# Patient Record
Sex: MALE | Race: BLACK OR AFRICAN AMERICAN | NOT HISPANIC OR LATINO | Employment: UNEMPLOYED | URBAN - METROPOLITAN AREA
[De-identification: names, ages, dates, MRNs, and addresses within clinical notes are randomized per-mention and may not be internally consistent; named-entity substitution may affect disease eponyms.]

---

## 2017-06-07 ENCOUNTER — HISTORICAL (OUTPATIENT)
Dept: RADIOLOGY | Facility: HOSPITAL | Age: 42
End: 2017-06-07

## 2017-07-05 ENCOUNTER — HISTORICAL (OUTPATIENT)
Dept: RADIOLOGY | Facility: HOSPITAL | Age: 42
End: 2017-07-05

## 2018-12-24 ENCOUNTER — HISTORICAL (OUTPATIENT)
Dept: LAB | Facility: HOSPITAL | Age: 43
End: 2018-12-24

## 2018-12-24 LAB
ABS NEUT (OLG): 3.2 X10(3)/MCL (ref 1.5–6.9)
ALBUMIN SERPL-MCNC: 3.3 GM/DL (ref 3.4–5)
ALBUMIN/GLOB SERPL: 0.8 RATIO
ALP SERPL-CCNC: 127 UNIT/L (ref 30–113)
ALT SERPL-CCNC: 30 UNIT/L (ref 10–45)
AST SERPL-CCNC: 28 UNIT/L (ref 15–37)
BASOPHILS # BLD AUTO: 0.1 X10(3)/MCL (ref 0–0.1)
BASOPHILS NFR BLD AUTO: 1 % (ref 0–1)
BILIRUB SERPL-MCNC: 0.4 MG/DL (ref 0.1–0.9)
BILIRUBIN DIRECT+TOT PNL SERPL-MCNC: 0.1 MG/DL (ref 0–0.3)
BILIRUBIN DIRECT+TOT PNL SERPL-MCNC: 0.3 MG/DL
BUN SERPL-MCNC: 13 MG/DL (ref 10–20)
CALCIUM SERPL-MCNC: 8.6 MG/DL (ref 8–10.5)
CHLORIDE SERPL-SCNC: 106 MMOL/L (ref 100–108)
CHOLEST SERPL-MCNC: 210 MG/DL (ref 140–200)
CHOLEST/HDLC SERPL: 6 MG/DL (ref 0–8)
CO2 SERPL-SCNC: 27 MMOL/L (ref 21–35)
CREAT SERPL-MCNC: 1.22 MG/DL (ref 0.7–1.3)
EOSINOPHIL # BLD AUTO: 0.2 X10(3)/MCL (ref 0–0.6)
EOSINOPHIL NFR BLD AUTO: 2 % (ref 0–5)
ERYTHROCYTE [DISTWIDTH] IN BLOOD BY AUTOMATED COUNT: 14.3 % (ref 11.5–17)
GLOBULIN SER-MCNC: 4.1 GM/DL
GLUCOSE SERPL-MCNC: 111 MG/DL (ref 75–116)
HCT VFR BLD AUTO: 41.8 % (ref 42–52)
HDLC SERPL-MCNC: 37 MG/DL (ref 35–59)
HGB BLD-MCNC: 12.8 GM/DL (ref 14–18)
IMM GRANULOCYTES # BLD AUTO: 0.03 10*3/UL (ref 0–0.02)
IMM GRANULOCYTES NFR BLD AUTO: 0.4 % (ref 0–0.43)
LDLC SERPL CALC-MCNC: 163 MG/DL (ref 100–129)
LYMPHOCYTES # BLD AUTO: 3 X10(3)/MCL (ref 0.5–4.1)
LYMPHOCYTES NFR BLD AUTO: 41 % (ref 15–40)
MCH RBC QN AUTO: 28 PG (ref 27–34)
MCHC RBC AUTO-ENTMCNC: 31 GM/DL (ref 31–36)
MCV RBC AUTO: 93 FL (ref 80–99)
MONOCYTES # BLD AUTO: 0.8 X10(3)/MCL (ref 0–1.1)
MONOCYTES NFR BLD AUTO: 11 % (ref 4–12)
NEUTROPHILS # BLD AUTO: 3.2 X10(3)/MCL (ref 1.5–6.9)
NEUTROPHILS NFR BLD AUTO: 45 % (ref 43–75)
PLATELET # BLD AUTO: 250 X10(3)/MCL (ref 140–400)
PMV BLD AUTO: 9.2 FL (ref 6.8–10)
POTASSIUM SERPL-SCNC: 4.2 MMOL/L (ref 3.6–5.2)
PROT SERPL-MCNC: 7.4 GM/DL (ref 6.4–8.2)
RBC # BLD AUTO: 4.51 X10(6)/MCL (ref 4.7–6.1)
SODIUM SERPL-SCNC: 141 MMOL/L (ref 135–145)
TRIGL SERPL-MCNC: 65 MG/DL (ref 35–150)
TSH SERPL-ACNC: 0.41 MIU/ML (ref 0.36–3.74)
VLDLC SERPL CALC-MCNC: 13 MG/DL
WBC # SPEC AUTO: 7.3 X10(3)/MCL (ref 4.5–11.5)

## 2019-08-16 ENCOUNTER — HISTORICAL (OUTPATIENT)
Dept: RADIOLOGY | Facility: HOSPITAL | Age: 44
End: 2019-08-16

## 2020-07-11 ENCOUNTER — HOSPITAL ENCOUNTER (EMERGENCY)
Facility: HOSPITAL | Age: 45
Discharge: HOME | End: 2020-07-11
Attending: EMERGENCY MEDICINE
Payer: MEDICAID

## 2020-07-11 VITALS
DIASTOLIC BLOOD PRESSURE: 111 MMHG | OXYGEN SATURATION: 98 % | TEMPERATURE: 98 F | RESPIRATION RATE: 14 BRPM | HEART RATE: 86 BPM | SYSTOLIC BLOOD PRESSURE: 159 MMHG

## 2020-07-11 DIAGNOSIS — J18.9 PNEUMONIA OF RIGHT LOWER LOBE DUE TO INFECTIOUS ORGANISM: Primary | ICD-10-CM

## 2020-07-11 DIAGNOSIS — I10 ESSENTIAL HYPERTENSION, BENIGN: ICD-10-CM

## 2020-07-11 PROBLEM — F29 PSYCHOSIS: Status: ACTIVE | Noted: 2020-07-11

## 2020-07-11 PROBLEM — R05.9 COUGH: Status: ACTIVE | Noted: 2020-07-11

## 2020-07-11 LAB — SARS-COV-2 RDRP RESP QL NAA+PROBE: NEGATIVE

## 2020-07-11 PROCEDURE — 99283 EMERGENCY DEPT VISIT LOW MDM: CPT | Mod: 25,ER

## 2020-07-11 PROCEDURE — U0002 COVID-19 LAB TEST NON-CDC: HCPCS | Mod: ER

## 2020-07-11 PROCEDURE — 63700000 PHARM REV CODE 250 ALT 637 W/O HCPCS: Mod: ER | Performed by: PHYSICIAN ASSISTANT

## 2020-07-11 PROCEDURE — 25000003 PHARM REV CODE 250: Mod: ER | Performed by: PHYSICIAN ASSISTANT

## 2020-07-11 RX ORDER — AZITHROMYCIN 250 MG/1
500 TABLET, FILM COATED ORAL
Status: COMPLETED | OUTPATIENT
Start: 2020-07-11 | End: 2020-07-11

## 2020-07-11 RX ORDER — AZITHROMYCIN 250 MG/1
TABLET, FILM COATED ORAL
Qty: 4 TABLET | Refills: 0 | Status: ON HOLD | OUTPATIENT
Start: 2020-07-12 | End: 2020-07-21 | Stop reason: HOSPADM

## 2020-07-11 RX ORDER — CLONIDINE HYDROCHLORIDE 0.1 MG/1
0.1 TABLET ORAL
Status: COMPLETED | OUTPATIENT
Start: 2020-07-11 | End: 2020-07-11

## 2020-07-11 RX ADMIN — CLONIDINE HYDROCHLORIDE 0.1 MG: 0.1 TABLET ORAL at 03:07

## 2020-07-11 RX ADMIN — AZITHROMYCIN MONOHYDRATE 500 MG: 250 TABLET ORAL at 03:07

## 2020-07-11 NOTE — ED PROVIDER NOTES
Encounter Date: 7/11/2020       History     Chief Complaint   Patient presents with    Cough     Per [facility] cough for last 3 days with no covid test on file. would like pt tested.     Patient presents via EMS from [facility] with complaint of 2-3 week history of cough. No chest pain or shortness of breath. No fever or chills. No known exposure to illness.         Review of patient's allergies indicates:  No Known Allergies  History reviewed. No pertinent past medical history.  No past surgical history on file.  History reviewed. No pertinent family history.  Social History     Tobacco Use    Smoking status: Not on file   Substance Use Topics    Alcohol use: Not on file    Drug use: Not on file     Review of Systems   Constitutional: Negative for activity change, appetite change, chills and fever.   HENT: Negative for congestion, ear pain and sore throat.    Respiratory: Positive for cough. Negative for shortness of breath, wheezing and stridor.    Cardiovascular: Negative for chest pain, palpitations and leg swelling.   Gastrointestinal: Negative for abdominal pain, diarrhea, nausea and vomiting.   Musculoskeletal: Negative for back pain, neck pain and neck stiffness.   Skin: Negative for rash.   Neurological: Negative for dizziness, weakness, numbness and headaches.   All other systems reviewed and are negative.      Physical Exam     Initial Vitals [07/11/20 1449]   BP Pulse Resp Temp SpO2   (!) 149/102 89 18 98.4 °F (36.9 °C) 97 %      MAP       --         Physical Exam    Nursing note and vitals reviewed.  Constitutional: He appears well-developed and well-nourished. He appears distressed (mild. Non-toxic appearing).   HENT:   Head: Normocephalic and atraumatic.   Mouth/Throat: Oropharynx is clear and moist.   Neck: Normal range of motion. Neck supple.   Cardiovascular: Normal rate, regular rhythm, normal heart sounds and intact distal pulses.   Pulmonary/Chest: Breath sounds normal. No respiratory  distress.   Musculoskeletal: No edema.   Lymphadenopathy:     He has no cervical adenopathy.   Neurological: He is alert and oriented to person, place, and time.   Skin: Skin is warm and dry. No rash noted.   Psychiatric: He has a normal mood and affect. His behavior is normal. Judgment and thought content normal.         ED Course   Procedures  Labs Reviewed   SARS-COV-2 RNA AMPLIFICATION, QUAL          Imaging Results          X-Ray Chest AP Portable (Final result)  Result time 07/11/20 15:10:44    Final result by Kareem Harding MD (07/11/20 15:10:44)                 Impression:      Subtle right basilar atelectasis and/or infiltrate      Electronically signed by: Kareem Harding MD  Date:    07/11/2020  Time:    15:10             Narrative:    EXAMINATION:  XR CHEST AP PORTABLE    CLINICAL HISTORY:  Suspected Covid-19 Virus Infection;    COMPARISON:  None.    FINDINGS:  The left lung appears clear.  There is some subtle right basilar atelectasis and/or infiltrate in the right lung base.  There is mild cardiomegaly.  There is no pleural effusion or pneumothorax or pulmonary edema.                                 Medical Decision Making:   Cannot completely exclude Covid 19 given the chest xray findings, however the rapid test was negative. Discussed this with the nurse at [facility]. Recommend quarantine until at least 72 hours after symptoms resolve.                                  Clinical Impression:       ICD-10-CM ICD-9-CM   1. Pneumonia of right lower lobe due to infectious organism  J18.1 486   2. Essential hypertension, benign  I10 401.1         Disposition:   Disposition: Discharged     ED Disposition Condition    Discharge Stable        ED Prescriptions     Medication Sig Dispense Start Date End Date Auth. Provider    azithromycin (ZITHROMAX Z-THALIA) 250 MG tablet Take 1 tablet daily 4 tablet 7/12/2020  IZABELLA Mariscal        Follow-up Information    Amanuel Lorenzo  IZABELLA Reynolds  07/11/20 1548       IZABELLA Mariscal  07/11/20 1701

## 2020-07-11 NOTE — DISCHARGE INSTRUCTIONS
Cannot completely rule out Covid 19. Recommend quarantine for 10 days from onset AND 3 days after symptoms resolve.

## 2020-08-16 ENCOUNTER — HISTORICAL (OUTPATIENT)
Dept: RADIOLOGY | Facility: HOSPITAL | Age: 45
End: 2020-08-16

## 2020-09-17 ENCOUNTER — HISTORICAL (OUTPATIENT)
Dept: RADIOLOGY | Facility: HOSPITAL | Age: 45
End: 2020-09-17

## 2021-08-03 ENCOUNTER — HISTORICAL (OUTPATIENT)
Dept: RADIOLOGY | Facility: HOSPITAL | Age: 46
End: 2021-08-03

## 2022-04-12 NOTE — ED NOTES
Patient standing in doorway talking with staff. No distress noted.     Product 66 Price/Unit (In Dollars): 0 Product 2 Unit Type: mg Product 14 Application Directions: apply to AA QD Product 14 Unit Type: grams Product 1 Price/Unit (In Dollars): 45 Product 1 Refills: 3 Name Of Product 14: Rosacea Silicone Gel Render Refills If Set To 0: Yes Product 14 Refills: 6 Product 14 Amount/Unit (Numbers Only): 30 Product 1 Units Dispensed: 1 Product 1 Application Directions: Apply a small amount to the entire face nightly Name Of Product 13: Rosacea Cream- Azelaic Acid Detail Level: Zone

## 2022-05-15 ENCOUNTER — HOSPITAL ENCOUNTER (EMERGENCY)
Facility: HOSPITAL | Age: 47
Discharge: HOME | End: 2022-05-15
Attending: INTERNAL MEDICINE
Payer: MEDICAID

## 2022-05-15 VITALS
HEART RATE: 72 BPM | SYSTOLIC BLOOD PRESSURE: 154 MMHG | DIASTOLIC BLOOD PRESSURE: 96 MMHG | WEIGHT: 235 LBS | HEIGHT: 68 IN | OXYGEN SATURATION: 98 % | BODY MASS INDEX: 35.61 KG/M2 | RESPIRATION RATE: 18 BRPM | TEMPERATURE: 99 F

## 2022-05-15 DIAGNOSIS — R79.89 ELEVATED SERUM CREATININE: ICD-10-CM

## 2022-05-15 DIAGNOSIS — F19.10 POLYSUBSTANCE ABUSE: Primary | ICD-10-CM

## 2022-05-15 DIAGNOSIS — T40.601A OPIATE OVERDOSE, ACCIDENTAL OR UNINTENTIONAL, INITIAL ENCOUNTER: ICD-10-CM

## 2022-05-15 LAB
ALBUMIN SERPL-MCNC: 3.5 GM/DL (ref 3.5–5)
ALBUMIN/GLOB SERPL: 1.1 RATIO (ref 1.1–2)
ALP SERPL-CCNC: 81 UNIT/L (ref 40–150)
ALT SERPL-CCNC: 23 UNIT/L (ref 0–55)
AMPHET UR QL SCN: NEGATIVE
APAP SERPL-MCNC: <17.4 UG/ML (ref 17.4–30)
APPEARANCE UR: CLEAR
AST SERPL-CCNC: 23 UNIT/L (ref 5–34)
BACTERIA #/AREA URNS AUTO: ABNORMAL /HPF
BARBITURATE SCN PRESENT UR: NEGATIVE
BASOPHILS # BLD AUTO: 0.05 X10(3)/MCL (ref 0–0.2)
BASOPHILS NFR BLD AUTO: 0.5 %
BENZODIAZ UR QL SCN: NEGATIVE
BILIRUB UR QL STRIP.AUTO: NEGATIVE MG/DL
BILIRUBIN DIRECT+TOT PNL SERPL-MCNC: 0.3 MG/DL
BUN SERPL-MCNC: 13 MG/DL (ref 8.9–20.6)
CALCIUM SERPL-MCNC: 8.7 MG/DL (ref 8.4–10.2)
CANNABINOIDS UR QL SCN: POSITIVE
CHLORIDE SERPL-SCNC: 108 MMOL/L (ref 98–107)
CO2 SERPL-SCNC: 24 MMOL/L (ref 22–29)
COCAINE UR QL SCN: POSITIVE
COLOR UR AUTO: YELLOW
CREAT SERPL-MCNC: 1.66 MG/DL (ref 0.73–1.18)
EOSINOPHIL # BLD AUTO: 0.15 X10(3)/MCL (ref 0–0.9)
EOSINOPHIL NFR BLD AUTO: 1.5 %
ERYTHROCYTE [DISTWIDTH] IN BLOOD BY AUTOMATED COUNT: 14.6 % (ref 11.5–17)
ETHANOL SERPL-MCNC: <10 MG/DL
FENTANYL UR QL SCN: POSITIVE
GLOBULIN SER-MCNC: 3.3 GM/DL (ref 2.4–3.5)
GLUCOSE SERPL-MCNC: 163 MG/DL (ref 74–100)
GLUCOSE UR QL STRIP.AUTO: NEGATIVE MG/DL
HCT VFR BLD AUTO: 39.3 % (ref 42–52)
HGB BLD-MCNC: 12.2 GM/DL (ref 14–18)
IMM GRANULOCYTES # BLD AUTO: 0.07 X10(3)/MCL (ref 0–0.02)
IMM GRANULOCYTES NFR BLD AUTO: 0.7 % (ref 0–0.43)
KETONES UR QL STRIP.AUTO: NEGATIVE MG/DL
LEUKOCYTE ESTERASE UR QL STRIP.AUTO: NEGATIVE UNIT/L
LYMPHOCYTES # BLD AUTO: 2.91 X10(3)/MCL (ref 0.6–4.6)
LYMPHOCYTES NFR BLD AUTO: 28.6 %
MCH RBC QN AUTO: 29.2 PG (ref 27–31)
MCHC RBC AUTO-ENTMCNC: 31 MG/DL (ref 33–36)
MCV RBC AUTO: 94 FL (ref 80–94)
MDMA UR QL SCN: NEGATIVE
MONOCYTES # BLD AUTO: 0.82 X10(3)/MCL (ref 0.1–1.3)
MONOCYTES NFR BLD AUTO: 8.1 %
MUCOUS THREADS URNS QL MICRO: ABNORMAL /LPF
NEUTROPHILS # BLD AUTO: 6.2 X10(3)/MCL (ref 2.1–9.2)
NEUTROPHILS NFR BLD AUTO: 60.6 %
NITRITE UR QL STRIP.AUTO: NEGATIVE
OPIATES UR QL SCN: NEGATIVE
PCP UR QL: POSITIVE
PH UR STRIP.AUTO: 5 [PH]
PH UR: 5 [PH] (ref 3–11)
PLATELET # BLD AUTO: 296 X10(3)/MCL (ref 130–400)
PMV BLD AUTO: 8.8 FL (ref 9.4–12.4)
POTASSIUM SERPL-SCNC: 3.9 MMOL/L (ref 3.5–5.1)
PROT SERPL-MCNC: 6.8 GM/DL (ref 6.4–8.3)
PROT UR QL STRIP.AUTO: ABNORMAL MG/DL
RBC # BLD AUTO: 4.18 X10(6)/MCL (ref 4.7–6.1)
RBC #/AREA URNS AUTO: ABNORMAL /HPF
RBC UR QL AUTO: NEGATIVE UNIT/L
SODIUM SERPL-SCNC: 141 MMOL/L (ref 136–145)
SP GR UR STRIP.AUTO: >=1.03
SPECIFIC GRAVITY, URINE AUTO (.000) (OHS): >=1.03 (ref 1–1.03)
SQUAMOUS #/AREA URNS AUTO: ABNORMAL /LPF
UROBILINOGEN UR STRIP-ACNC: 0.2 MG/DL
WBC # SPEC AUTO: 10.2 X10(3)/MCL (ref 4.5–11.5)
WBC #/AREA URNS AUTO: ABNORMAL /HPF

## 2022-05-15 PROCEDURE — 96374 THER/PROPH/DIAG INJ IV PUSH: CPT

## 2022-05-15 PROCEDURE — 96361 HYDRATE IV INFUSION ADD-ON: CPT

## 2022-05-15 PROCEDURE — 81001 URINALYSIS AUTO W/SCOPE: CPT | Mod: 59 | Performed by: NURSE PRACTITIONER

## 2022-05-15 PROCEDURE — 80053 COMPREHEN METABOLIC PANEL: CPT | Performed by: NURSE PRACTITIONER

## 2022-05-15 PROCEDURE — 80307 DRUG TEST PRSMV CHEM ANLYZR: CPT | Performed by: NURSE PRACTITIONER

## 2022-05-15 PROCEDURE — 63600175 PHARM REV CODE 636 W HCPCS: Performed by: NURSE PRACTITIONER

## 2022-05-15 PROCEDURE — 36415 COLL VENOUS BLD VENIPUNCTURE: CPT | Performed by: NURSE PRACTITIONER

## 2022-05-15 PROCEDURE — 80143 DRUG ASSAY ACETAMINOPHEN: CPT | Performed by: NURSE PRACTITIONER

## 2022-05-15 PROCEDURE — 82077 ASSAY SPEC XCP UR&BREATH IA: CPT | Performed by: NURSE PRACTITIONER

## 2022-05-15 PROCEDURE — 99284 EMERGENCY DEPT VISIT MOD MDM: CPT | Mod: 25

## 2022-05-15 PROCEDURE — 85025 COMPLETE CBC W/AUTO DIFF WBC: CPT | Performed by: NURSE PRACTITIONER

## 2022-05-15 RX ORDER — NALOXONE HYDROCHLORIDE 1 MG/ML
INJECTION INTRAMUSCULAR; INTRAVENOUS; SUBCUTANEOUS
Qty: 2 ML | Refills: 11 | Status: SHIPPED | OUTPATIENT
Start: 2022-05-15

## 2022-05-15 RX ORDER — NALOXONE HCL 0.4 MG/ML
1 VIAL (ML) INJECTION
Status: COMPLETED | OUTPATIENT
Start: 2022-05-15 | End: 2022-05-15

## 2022-05-15 RX ADMIN — SODIUM CHLORIDE, POTASSIUM CHLORIDE, SODIUM LACTATE AND CALCIUM CHLORIDE 1000 ML: 600; 310; 30; 20 INJECTION, SOLUTION INTRAVENOUS at 05:05

## 2022-05-15 RX ADMIN — NALOXONE HYDROCHLORIDE 1 MG: 0.4 INJECTION, SOLUTION INTRAMUSCULAR; INTRAVENOUS; SUBCUTANEOUS at 05:05

## 2022-05-15 NOTE — ED PROVIDER NOTES
"Encounter Date: 5/15/2022       History     Chief Complaint   Patient presents with    Drug Overdose     Found unresponsive with GCS 3,  Narcan 2mg given pta,  GCS is now 1,  pt states " I was just chillin"      46 year old brought in by EMS. Pt.was found unresponsive in car by bystander and called PD. Narcan 2 mg intranasally given by PD upon which he awoke. EMS did not give him additional Narcan. Pt.st. he does not know what happened to him. He st.did drugs today but does not know what.         Review of patient's allergies indicates:  No Known Allergies  No past medical history on file.  No past surgical history on file.  No family history on file.  Social History     Tobacco Use    Smoking status: Never Smoker   Substance Use Topics    Alcohol use: Yes     Review of Systems   Unable to perform ROS: Mental status change       Physical Exam     Initial Vitals [05/15/22 1715]   BP Pulse Resp Temp SpO2   118/60 105 18 98.6 °F (37 °C) 100 %      MAP       --         Physical Exam    Nursing note and vitals reviewed.  Constitutional: He appears well-developed and well-nourished.   Somnolent but arouses with verbal stimulation.   HENT:   Head: Normocephalic and atraumatic.   Right Ear: Tympanic membrane normal.   Left Ear: Tympanic membrane normal.   Nose: Nose normal.   Mouth/Throat: Oropharynx is clear and moist.   Gag reflex intact   Eyes: Conjunctivae and lids are normal.   Pupils pinpoint bilaterally   Neck: Neck supple.   Normal range of motion.  Cardiovascular: Regular rhythm and normal heart sounds. Tachycardia present.    Pulmonary/Chest: Effort normal.   Pulse ox decreases to 88% when patient falls asleep. Oxygen per NC x 3 L administered.   Abdominal: Abdomen is soft and protuberant. Bowel sounds are normal. There is no abdominal tenderness.   Musculoskeletal:         General: Normal range of motion.      Cervical back: Normal range of motion and neck supple.      Comments: No signs of trauma "     Neurological: He has normal strength. No cranial nerve deficit or sensory deficit. GCS eye subscore is 4. GCS verbal subscore is 5. GCS motor subscore is 6.   Somnolent but arouses with verbal stimuli.   Skin: Skin is warm, dry and intact.   Psychiatric: He has a normal mood and affect. His speech is normal and behavior is normal. Thought content normal. He is not slowed. He expresses no homicidal and no suicidal ideation. He exhibits abnormal recent memory.   Pt.has not recall of events prior to arrival in ED.         ED Course   Procedures  Labs Reviewed   COMPREHENSIVE METABOLIC PANEL - Abnormal; Notable for the following components:       Result Value    Chloride 108 (*)     Glucose Level 163 (*)     Creatinine 1.66 (*)     All other components within normal limits   URINALYSIS, REFLEX TO URINE CULTURE - Abnormal; Notable for the following components:    Protein, UA Trace (*)     All other components within normal limits   DRUG SCREEN, URINE (BEAKER) - Abnormal; Notable for the following components:    Cannabinoids, Urine Positive (*)     Cocaine, Urine Positive (*)     Fentanyl, Urine Positive (*)     Phencyclidine, Urine Positive (*)     All other components within normal limits   ACETAMINOPHEN LEVEL - Abnormal; Notable for the following components:    Acetaminophen Level <17.4 (*)     All other components within normal limits   CBC WITH DIFFERENTIAL - Abnormal; Notable for the following components:    RBC 4.18 (*)     Hgb 12.2 (*)     Hct 39.3 (*)     MCHC 31.0 (*)     MPV 8.8 (*)     IG# 0.07 (*)     IG% 0.7 (*)     All other components within normal limits   URINALYSIS, MICROSCOPIC - Abnormal; Notable for the following components:    Mucous, UA Trace (*)     Squamous Epithelial Cells, UA Few (*)     All other components within normal limits   ALCOHOL,MEDICAL (ETHANOL) - Normal   CBC W/ AUTO DIFFERENTIAL    Narrative:     The following orders were created for panel order CBC auto differential.  Procedure                                Abnormality         Status                     ---------                               -----------         ------                     CBC with Differential[[id number]]        Abnormal            Final result                 Please view results for these tests on the individual orders.     Admission on 05/15/2022   Component Date Value Ref Range Status    Sodium Level 05/15/2022 141  136 - 145 mmol/L Final    Potassium Level 05/15/2022 3.9  3.5 - 5.1 mmol/L Final    Chloride 05/15/2022 108 (A) 98 - 107 mmol/L Final    Carbon Dioxide 05/15/2022 24  22 - 29 mmol/L Final    Glucose Level 05/15/2022 163 (A) 74 - 100 mg/dL Final    Blood Urea Nitrogen 05/15/2022 13.0  8.9 - 20.6 mg/dL Final    Creatinine 05/15/2022 1.66 (A) 0.73 - 1.18 mg/dL Final    Calcium Level Total 05/15/2022 8.7  8.4 - 10.2 mg/dL Final    Protein Total 05/15/2022 6.8  6.4 - 8.3 gm/dL Final    Albumin Level 05/15/2022 3.5  3.5 - 5.0 gm/dL Final    Globulin 05/15/2022 3.3  2.4 - 3.5 gm/dL Final    Albumin/Globulin Ratio 05/15/2022 1.1  1.1 - 2.0 ratio Final    Bilirubin Total 05/15/2022 0.3  <=1.5 mg/dL Final    Alkaline Phosphatase 05/15/2022 81  40 - 150 unit/L Final    Alanine Aminotransferase 05/15/2022 23  0 - 55 unit/L Final    Aspartate Aminotransferase 05/15/2022 23  5 - 34 unit/L Final    Estimated GFR- 05/15/2022 58  mls/min/1.73/m2 Final    Color, UA 05/15/2022 Yellow  Yellow, Colorless, Other, Clear Final    Appearance, UA 05/15/2022 Clear  Clear Final    Specific Gravity, UA 05/15/2022 >=1.030   Final    pH, UA 05/15/2022 5.0  5.0, 5.5, 6.0, 6.5, 7.0, 7.5, 8.0, 8.5 Final    Protein, UA 05/15/2022 Trace (A) Negative, 300  mg/dL Final    Glucose, UA 05/15/2022 Negative  Negative, Normal mg/dL Final    Ketones, UA 05/15/2022 Negative  Negative, +1, +2, +3, +4, +5, >=160 mg/dL Final    Blood, UA 05/15/2022 Negative  Negative unit/L Final    Bilirubin, UA 05/15/2022  Negative  Negative mg/dL Final    Urobilinogen, UA 05/15/2022 0.2  0.2, 1.0, Normal mg/dL Final    Nitrites, UA 05/15/2022 Negative  Negative Final    Leukocyte Esterase, UA 05/15/2022 Negative  Negative, 75  unit/L Final    Ethanol Level 05/15/2022 <10.0  <=10.0 mg/dL Final    Amphetamines, Urine 05/15/2022 Negative  Negative Final    Barbituates, Urine 05/15/2022 Negative  Negative Final    Benzodiazepine, Urine 05/15/2022 Negative  Negative Final    Cannabinoids, Urine 05/15/2022 Positive (A) Negative Final    Cocaine, Urine 05/15/2022 Positive (A) Negative Final    Fentanyl, Urine 05/15/2022 Positive (A) Negative Final    Mdma, Urine 05/15/2022 Negative  Negative Final    Opiates, Urine 05/15/2022 Negative  Negative Final    Phencyclidine, Urine 05/15/2022 Positive (A) Negative Final    pH, Urine 05/15/2022 5.0  3.0 - 11.0 Final    Specific Gravity, Urine Auto 05/15/2022 >=1.030  1.001 - 1.035 Final    Acetaminophen Level 05/15/2022 <17.4 (A) 17.4 - 30.0 ug/ml Final    WBC 05/15/2022 10.2  4.5 - 11.5 x10(3)/mcL Final    RBC 05/15/2022 4.18 (A) 4.70 - 6.10 x10(6)/mcL Final    Hgb 05/15/2022 12.2 (A) 14.0 - 18.0 gm/dL Final    Hct 05/15/2022 39.3 (A) 42.0 - 52.0 % Final    MCV 05/15/2022 94.0  80.0 - 94.0 fL Final    MCH 05/15/2022 29.2  27.0 - 31.0 pg Final    MCHC 05/15/2022 31.0 (A) 33.0 - 36.0 mg/dL Final    RDW 05/15/2022 14.6  11.5 - 17.0 % Final    Platelet 05/15/2022 296  130 - 400 x10(3)/mcL Final    MPV 05/15/2022 8.8 (A) 9.4 - 12.4 fL Final    Neut % 05/15/2022 60.6  % Final    Lymph % 05/15/2022 28.6  % Final    Mono Auto 05/15/2022 8.1  % Final    Eos Auto 05/15/2022 1.5  % Final    Basophil Auto 05/15/2022 0.5  % Final    Lymph # 05/15/2022 2.91  0.6 - 4.6 x10(3)/mcL Final    Neut # 05/15/2022 6.2  2.1 - 9.2 x10(3)/mcL Final    Abs Mono 05/15/2022 0.82  0.1 - 1.3 x10(3)/mcL Final    Abs Eos 05/15/2022 0.15  0 - 0.9 x10(3)/mcL Final    Abs Baso 05/15/2022 0.05  0 -  0.2 x10(3)/mcL Final    IG# 05/15/2022 0.07 (A) 0 - 0.0155 x10(3)/mcL Final    IG% 05/15/2022 0.7 (A) 0 - 0.43 % Final    Bacteria, UA 05/15/2022 None Seen  None Seen, Rare, Occasional /HPF Final    Mucous, UA 05/15/2022 Trace (A) None Seen /LPF Final    RBC, UA 05/15/2022 0-2  None Seen, 0-2, 3-5, 0-5 /HPF Final    WBC, UA 05/15/2022 None Seen  None Seen, 0-2, 3-5, 0-5 /HPF Final    Squamous Epithelial Cells, UA 05/15/2022 Few (A) None Seen, Rare, Occasional, Occ /LPF Final          Imaging Results    None          Medications   lactated ringers bolus 1,000 mL (0 mLs Intravenous Stopped 5/15/22 1848)   naloxone 0.4 mg/mL injection 1 mg (1 mg Intravenous Given 5/15/22 1748)     Medical Decision Making:   History:   I obtained history from: someone other than patient and EMS provider.  Clinical Tests:   Lab Tests: Ordered and Reviewed  1944 Pt. Awake, alert. St. Is ready to go home. Had long discussion with patient regarding his drug use, high blood pressure, and his worsening kidney function. I stressed the need for close follow up by his PCP and the need to stop abusing drugs. He voiced understanding.                       Clinical Impression:   Final diagnoses:  [F19.10] Polysubstance abuse (Primary)  [T40.601A] Opiate overdose, accidental or unintentional, initial encounter  [R79.89] Elevated serum creatinine          ED Disposition Condition    Discharge Stable        ED Prescriptions     None        Follow-up Information     Follow up With Specialties Details Why Contact Info    Gordy Christina NP Family Medicine In 2 days  [address] [place] [id number]  [phone]             FLORENCIA Kilgore  05/15/22 2012

## 2022-05-16 NOTE — DISCHARGE INSTRUCTIONS
Follow up with your primary care provider in 2-3 days.   Make sure you are drinking adequate fluids.  Stop abusing drugs!    Your blood pressure is elevated today in the ED. It is recommended that you followup with your primary care provider in 2-3 days for reevaluation.